# Patient Record
Sex: MALE | Race: WHITE | NOT HISPANIC OR LATINO | Employment: OTHER | URBAN - METROPOLITAN AREA
[De-identification: names, ages, dates, MRNs, and addresses within clinical notes are randomized per-mention and may not be internally consistent; named-entity substitution may affect disease eponyms.]

---

## 2022-08-13 ENCOUNTER — OFFICE VISIT (OUTPATIENT)
Dept: URGENT CARE | Facility: URGENT CARE | Age: 77
End: 2022-08-13
Payer: COMMERCIAL

## 2022-08-13 VITALS
HEIGHT: 70 IN | BODY MASS INDEX: 24.77 KG/M2 | DIASTOLIC BLOOD PRESSURE: 98 MMHG | RESPIRATION RATE: 16 BRPM | WEIGHT: 173 LBS | HEART RATE: 99 BPM | TEMPERATURE: 98.2 F | OXYGEN SATURATION: 99 % | SYSTOLIC BLOOD PRESSURE: 161 MMHG

## 2022-08-13 DIAGNOSIS — B02.9 HERPES ZOSTER WITHOUT COMPLICATION: Primary | ICD-10-CM

## 2022-08-13 DIAGNOSIS — R51.9 ACUTE NONINTRACTABLE HEADACHE, UNSPECIFIED HEADACHE TYPE: ICD-10-CM

## 2022-08-13 PROCEDURE — 99204 OFFICE O/P NEW MOD 45 MIN: CPT | Performed by: INTERNAL MEDICINE

## 2022-08-13 RX ORDER — METHYLPREDNISOLONE 4 MG
TABLET, DOSE PACK ORAL
COMMUNITY
Start: 2022-08-12

## 2022-08-13 RX ORDER — VALACYCLOVIR HYDROCHLORIDE 1 G/1
1000 TABLET, FILM COATED ORAL 3 TIMES DAILY
Qty: 21 TABLET | Refills: 0 | Status: SHIPPED | OUTPATIENT
Start: 2022-08-13 | End: 2022-08-20

## 2022-08-13 ASSESSMENT — PAIN SCALES - GENERAL: PAINLEVEL: MODERATE PAIN (5)

## 2022-08-14 NOTE — PROGRESS NOTES
"SUBJECTIVE:  Aaron Linares is an 77 year old male who presents for skin issue.  About 4-5 days ago had some pain in left side of his head/scalp.  Then in scalp and behind ear today there are small blister looking skin lesions.  Are itchy and painful to touch.  No fevers, chills, sweats.  Was seen a couple days ago for the pain in his head/neck and put on medrol dose pack which he started yesterday.  Pain is the same today as it was when seen.  No pain on right side of head or neck.    PMH:  Htn, hyperlipidemia    Social History     Socioeconomic History     Marital status:      Spouse name: None     Number of children: None     Years of education: None     Highest education level: None   Tobacco Use     Smoking status: Never Smoker     Smokeless tobacco: Never Used     No family history on file.    ALLERGIES:  Patient has no known allergies.    Current Outpatient Medications   Medication     methylPREDNISolone (MEDROL DOSEPAK) 4 MG tablet therapy pack   Lisinopril  A statin  No current facility-administered medications for this visit.         ROS:  ROS is done and is negative for general/constitutional, eye, ENT, Respiratory, cardiovascular, GI, , Skin, musculoskeletal except as noted elsewhere.  All other review of systems negative except as noted elsewhere.      OBJECTIVE:  BP (!) 161/98   Pulse 99   Temp 98.2  F (36.8  C) (Tympanic)   Resp 16   Ht 1.765 m (5' 9.5\")   Wt 78.5 kg (173 lb)   SpO2 99%   BMI 25.18 kg/m    GENERAL APPEARANCE: Alert, in no acute distress  EYES: normal  EARS: External ears normal. Canals clear. TM's normal.  NOSE:normal  OROPHARYNX:normal  NECK:No adenopathy,masses or thyromegaly  RESP: normal and clear to auscultation  CV:regular rate and rhythm and no murmurs, clicks, or gallops  ABDOMEN: Abdomen soft, non-tender. BS normal. No masses, organomegaly  SKIN: behind ear extending superiorly and posteriorly on scalp there are scattered 2-3 mm vesicles with mildly " erythematous based which are tender to touch, do not cross midline.  MUSCULOSKELETAL:Musculoskeletal normal      RESULTS  No results found for any visits on 08/13/22..  No results found for this or any previous visit (from the past 48 hour(s)).    ASSESSMENT/PLAN:    ASSESSMENT / PLAN:  (B02.9) Herpes zoster without complication  (primary encounter diagnosis)  Comment: hx and exam c/w herpes zoster in C2/3 region.  I suspect this is the source of the pain he was seen for a couple days ago as the pain is unchanged but now lesions are present.    Plan: valACYclovir (VALTREX) 1000 mg tablet        Reviewed medication instructions and side effects. Follow up if experiences side effects..  As his pain is most c/w being part of shingles, will have him stop the medrol dose pack he was put on and advised him to wait to see how he does before having his pcp order a CT which was discussed at his visit 2 or 3 days ago.  I reviewed supportive care, otc meds to use if needed, expected course, and signs of concern.  Follow up as needed or if he does not improve within 1 week(s) or if worsens in any way.  Reviewed red flag symptoms and is to go to the ER if experiences any of these.      (R51.9) Acute nonintractable headache, unspecified headache type  Comment: the pain in left side of head, left ear, and left neck are c/w pain from shingles  Plan: will have him stop solumedrol as was prescribed for muscular pain, but pain currently c/w shingles.  Monitor sxs and f/u with pcp if not improving within 7 days.      See [facility] for orders, medications, letters, patient instructions    Corrina George M.D.